# Patient Record
Sex: MALE | Race: ASIAN | NOT HISPANIC OR LATINO | URBAN - METROPOLITAN AREA
[De-identification: names, ages, dates, MRNs, and addresses within clinical notes are randomized per-mention and may not be internally consistent; named-entity substitution may affect disease eponyms.]

---
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
## 2019-04-05 ENCOUNTER — EMERGENCY (EMERGENCY)
Facility: HOSPITAL | Age: 19
LOS: 1 days | Discharge: HOME | End: 2019-04-05
Attending: EMERGENCY MEDICINE | Admitting: EMERGENCY MEDICINE
Payer: COMMERCIAL

## 2019-04-05 VITALS
RESPIRATION RATE: 18 BRPM | OXYGEN SATURATION: 100 % | TEMPERATURE: 98 F | SYSTOLIC BLOOD PRESSURE: 154 MMHG | DIASTOLIC BLOOD PRESSURE: 95 MMHG | HEART RATE: 100 BPM

## 2019-04-05 VITALS
DIASTOLIC BLOOD PRESSURE: 70 MMHG | RESPIRATION RATE: 17 BRPM | SYSTOLIC BLOOD PRESSURE: 147 MMHG | OXYGEN SATURATION: 100 % | HEART RATE: 80 BPM

## 2019-04-05 LAB
ALBUMIN SERPL ELPH-MCNC: 5.2 G/DL — HIGH (ref 3.3–5)
ALP SERPL-CCNC: 81 U/L — SIGNIFICANT CHANGE UP (ref 60–270)
ALT FLD-CCNC: 38 U/L — SIGNIFICANT CHANGE UP (ref 4–41)
ANION GAP SERPL CALC-SCNC: 13 MMO/L — SIGNIFICANT CHANGE UP (ref 7–14)
AST SERPL-CCNC: 22 U/L — SIGNIFICANT CHANGE UP (ref 4–40)
BASOPHILS # BLD AUTO: 0.05 K/UL — SIGNIFICANT CHANGE UP (ref 0–0.2)
BASOPHILS NFR BLD AUTO: 0.5 % — SIGNIFICANT CHANGE UP (ref 0–2)
BILIRUB SERPL-MCNC: 0.5 MG/DL — SIGNIFICANT CHANGE UP (ref 0.2–1.2)
BUN SERPL-MCNC: 12 MG/DL — SIGNIFICANT CHANGE UP (ref 7–23)
CALCIUM SERPL-MCNC: 10.1 MG/DL — SIGNIFICANT CHANGE UP (ref 8.4–10.5)
CHLORIDE SERPL-SCNC: 98 MMOL/L — SIGNIFICANT CHANGE UP (ref 98–107)
CO2 SERPL-SCNC: 28 MMOL/L — SIGNIFICANT CHANGE UP (ref 22–31)
CREAT SERPL-MCNC: 0.96 MG/DL — SIGNIFICANT CHANGE UP (ref 0.5–1.3)
EOSINOPHIL # BLD AUTO: 0.15 K/UL — SIGNIFICANT CHANGE UP (ref 0–0.5)
EOSINOPHIL NFR BLD AUTO: 1.4 % — SIGNIFICANT CHANGE UP (ref 0–6)
GLUCOSE SERPL-MCNC: 106 MG/DL — HIGH (ref 70–99)
HCT VFR BLD CALC: 48.9 % — SIGNIFICANT CHANGE UP (ref 39–50)
HGB BLD-MCNC: 16.1 G/DL — SIGNIFICANT CHANGE UP (ref 13–17)
IMM GRANULOCYTES NFR BLD AUTO: 0.4 % — SIGNIFICANT CHANGE UP (ref 0–1.5)
LYMPHOCYTES # BLD AUTO: 3.54 K/UL — HIGH (ref 1–3.3)
LYMPHOCYTES # BLD AUTO: 33.3 % — SIGNIFICANT CHANGE UP (ref 13–44)
MCHC RBC-ENTMCNC: 26 PG — LOW (ref 27–34)
MCHC RBC-ENTMCNC: 32.9 % — SIGNIFICANT CHANGE UP (ref 32–36)
MCV RBC AUTO: 79 FL — LOW (ref 80–100)
MONOCYTES # BLD AUTO: 0.78 K/UL — SIGNIFICANT CHANGE UP (ref 0–0.9)
MONOCYTES NFR BLD AUTO: 7.3 % — SIGNIFICANT CHANGE UP (ref 2–14)
NEUTROPHILS # BLD AUTO: 6.07 K/UL — SIGNIFICANT CHANGE UP (ref 1.8–7.4)
NEUTROPHILS NFR BLD AUTO: 57.1 % — SIGNIFICANT CHANGE UP (ref 43–77)
NRBC # FLD: 0 K/UL — SIGNIFICANT CHANGE UP (ref 0–0)
PLATELET # BLD AUTO: 284 K/UL — SIGNIFICANT CHANGE UP (ref 150–400)
PMV BLD: 10.1 FL — SIGNIFICANT CHANGE UP (ref 7–13)
POTASSIUM SERPL-MCNC: 4.4 MMOL/L — SIGNIFICANT CHANGE UP (ref 3.5–5.3)
POTASSIUM SERPL-SCNC: 4.4 MMOL/L — SIGNIFICANT CHANGE UP (ref 3.5–5.3)
PROT SERPL-MCNC: 7.9 G/DL — SIGNIFICANT CHANGE UP (ref 6–8.3)
RBC # BLD: 6.19 M/UL — HIGH (ref 4.2–5.8)
RBC # FLD: 13.3 % — SIGNIFICANT CHANGE UP (ref 10.3–14.5)
SODIUM SERPL-SCNC: 139 MMOL/L — SIGNIFICANT CHANGE UP (ref 135–145)
TROPONIN T, HIGH SENSITIVITY: < 6 NG/L — SIGNIFICANT CHANGE UP (ref ?–14)
TSH SERPL-MCNC: 4.3 UIU/ML — SIGNIFICANT CHANGE UP (ref 0.5–4.3)
WBC # BLD: 10.63 K/UL — HIGH (ref 3.8–10.5)
WBC # FLD AUTO: 10.63 K/UL — HIGH (ref 3.8–10.5)

## 2019-04-05 PROCEDURE — 71046 X-RAY EXAM CHEST 2 VIEWS: CPT | Mod: 26

## 2019-04-05 PROCEDURE — 99283 EMERGENCY DEPT VISIT LOW MDM: CPT | Mod: 25

## 2019-04-05 NOTE — ED PROVIDER NOTE - CLINICAL SUMMARY MEDICAL DECISION MAKING FREE TEXT BOX
- consider paroxysmal arrhythmia, r/o thyroid abnormality and anemia  - cbc, cmp, trop, tsh, ekg, cxr

## 2019-04-05 NOTE — ED PROVIDER NOTE - NSFOLLOWUPINSTRUCTIONS_ED_ALL_ED_FT
Recommend follow up with a primary care physician for check up.    You may benefit from a Holter monitor, follow up with cardiology.     Return to the emergency department for any worsening symptoms.

## 2019-04-05 NOTE — ED ADULT NURSE NOTE - OBJECTIVE STATEMENT
pt received to room 7 a/o x 3 c/o intermittent left sided non radiating chest pressure x 2 days, Dizziness, SOB lightheadedness at 1230am today. Pt states he still feels a little lightheaded but the SOB and dizziness had resolved. Respirations even and unlabored. Lung sounds clear with equal chest rise bilaterally. ABD is soft, non tender, non distended with normal active bowel sounds. No complaints of  headache, nausea, dizziness, vomiting  SOB, fever, chills verbalized. Pt placed on cardiac monitor in NSR.

## 2019-04-05 NOTE — ED ADULT TRIAGE NOTE - CHIEF COMPLAINT QUOTE
Pt states "my heart feels like someone is grabbing it on the left side" rad to left arm with numbness for 2 days. Also c/o lightheadedness, dizziness, SOB, palpitations. Appears anxious, states "I'm so scared right now." Denies pmhx. EKG to be obtained.

## 2019-04-05 NOTE — ED ADULT NURSE NOTE - NSIMPLEMENTINTERV_GEN_ALL_ED
Implemented All Universal Safety Interventions:  [place] to call system. Call bell, personal items and telephone within reach. Instruct patient to call for assistance. Room bathroom lighting operational. Non-slip footwear when patient is off stretcher. Physically safe environment: no spills, clutter or unnecessary equipment. Stretcher in lowest position, wheels locked, appropriate side rails in place.

## 2019-04-05 NOTE — ED PROVIDER NOTE - NSFOLLOWUPCLINICS_GEN_ALL_ED_FT
[facility]  Cardiology  [address] [place] [id number]  Phone: [phone]  Fax:   Follow Up Time:     [facility]  General Internal Medicine  [address] [place] [id number]  Phone: [phone]  Fax:   Follow Up Time:

## 2019-04-05 NOTE — ED PROVIDER NOTE - OBJECTIVE STATEMENT
18M denies pmh presents after episode of chest squeezing sensation with palpitations and SOB.  +Lightheadedness.  Paresthesia to LUE.  Denies fever/chills, n/v/d, recent travel, family h/o early cardiac disease.

## 2019-04-07 NOTE — ED ADULT TRIAGE NOTE - PRO INTERPRETER NEED 2
Gen: Alert, Well appearing. NAD    Head: NC, AT, PERRL, EOMI, normal lids/conjunctiva   Pulm: Bilateral clear BS, normal resp effort, no wheeze/stridor/retractions  CV: RRR, no M/R/G, +dist pulses   Abd: soft, ++ abd distension, ND, +BS, no guarding/rebound tenderness  Mskel: no edema/erythema/cyanosis   Skin: no rash   Neuro: AAOx3, no sensory/motor deficit
English

## 2019-09-12 NOTE — ED PROVIDER NOTE - NS_ATTENDINGSCRIBE_ED_ALL_ED
Addended by: TROY GUERRERO on: 9/12/2019 11:10 AM     Modules accepted: Orders    
I personally performed the service described in the documentation recorded by the scribe in my presence, and it accurately and completely records my words and actions.

## 2021-06-18 ENCOUNTER — EMERGENCY (EMERGENCY)
Facility: HOSPITAL | Age: 21
LOS: 1 days | Discharge: HOME | End: 2021-06-18
Admitting: EMERGENCY MEDICINE
Payer: COMMERCIAL

## 2021-06-18 VITALS
HEART RATE: 89 BPM | RESPIRATION RATE: 15 BRPM | DIASTOLIC BLOOD PRESSURE: 93 MMHG | TEMPERATURE: 99 F | OXYGEN SATURATION: 99 % | SYSTOLIC BLOOD PRESSURE: 146 MMHG

## 2021-06-18 PROCEDURE — 73610 X-RAY EXAM OF ANKLE: CPT | Mod: 26,RT

## 2021-06-18 PROCEDURE — 99283 EMERGENCY DEPT VISIT LOW MDM: CPT

## 2021-06-18 RX ORDER — IBUPROFEN 200 MG
400 TABLET ORAL ONCE
Refills: 0 | Status: COMPLETED | OUTPATIENT
Start: 2021-06-18 | End: 2021-06-18

## 2021-06-18 RX ADMIN — Medication 400 MILLIGRAM(S): at 23:27

## 2021-06-18 NOTE — ED PROVIDER NOTE - PATIENT PORTAL LINK FT
You can access the FollowMyHealth Patient Portal offered by [facility] by registering at the following website: [email or web address]. By joining [employer]’s FollowMyHealth portal, you will also be able to view your health information using other applications (apps) compatible with our system.

## 2021-06-18 NOTE — ED PROVIDER NOTE - CLINICAL SUMMARY MEDICAL DECISION MAKING FREE TEXT BOX
21 y/o M w/ no significant PMH presents to the ER c/o R ankle pain s/p twisting injury 2 days ago. Pt is well appearing and in NAD. Will obtain x-rays, give pain control, RICE and follow up ortho. 21 y/o Male with no significant PMHx presents to the ER c/o R ankle pain s/p twisting injury 2 days ago.  Pt is well appearing and in NAD. Will obtain x-rays, pain control, RICE and follow up ortho.

## 2021-06-18 NOTE — ED PROVIDER NOTE - PHYSICAL EXAMINATION
RLE: NV intact, +TTP along medial malleolus, mild swelling. Moving all toes. Foot non tender. Knee nontender w/ FROM.

## 2021-06-18 NOTE — ED PROVIDER NOTE - NSFOLLOWUPINSTRUCTIONS_ED_ALL_ED_FT
Follow up with orthopedics in 1-2 days.  (See list attached)    Rest, Ice 3-4 x a day x 48hours, elevate ankle, ace/aircast.    Use crutches to ambulate.    Take Ibuprofen 600mg orally every 8 hours as needed for pain take with food.    Return to the ER for any persistent/worsening or new symptoms weakness, numbness or any concerning symptoms.

## 2021-06-18 NOTE — ED ADULT TRIAGE NOTE - CHIEF COMPLAINT QUOTE
alert no distress c/o  left ankle pain twisted it playing basketball 2 days ago   pain is getting worse

## 2021-06-18 NOTE — ED PROVIDER NOTE - OBJECTIVE STATEMENT
19 y/o M w/ no significant PMH presents to the ER c/o R ankle pain s/p twisting injury 2 days ago. Pt states he was playing basketball when he twisted his ankle, causing injury. Pt denies any head trauma, LOC, weakness, numbness or tingling. 21 y/o Male with no significant PMHx presents to the ER c/o R ankle pain s/p twisting injury 2 days ago. Pt states he was playing basketball when he twisted his ankle, causing injury. Pt denies head trauma, LOC, weakness, numbness, tingling.

## 2022-01-07 NOTE — ED ADULT TRIAGE NOTE - RESPIRATORY RATE (BREATHS/MIN)
[Care Plan reviewed and provided to patient/caregiver] : Care plan reviewed and provided to patient/caregiver [Understands and communicates without difficulty] : Patient/Caregiver understands and communicates without difficulty 15

## 2022-11-20 ENCOUNTER — EMERGENCY (EMERGENCY)
Facility: HOSPITAL | Age: 22
LOS: 1 days | Discharge: HOME | End: 2022-11-20
Attending: EMERGENCY MEDICINE | Admitting: EMERGENCY MEDICINE

## 2022-11-20 VITALS
RESPIRATION RATE: 15 BRPM | TEMPERATURE: 98 F | SYSTOLIC BLOOD PRESSURE: 127 MMHG | DIASTOLIC BLOOD PRESSURE: 77 MMHG | OXYGEN SATURATION: 99 % | HEART RATE: 92 BPM

## 2022-11-20 PROCEDURE — 99283 EMERGENCY DEPT VISIT LOW MDM: CPT

## 2022-11-20 PROCEDURE — 73630 X-RAY EXAM OF FOOT: CPT | Mod: 26,RT

## 2022-11-20 RX ORDER — IBUPROFEN 200 MG
600 TABLET ORAL ONCE
Refills: 0 | Status: COMPLETED | OUTPATIENT
Start: 2022-11-20 | End: 2022-11-20

## 2022-11-20 RX ADMIN — Medication 600 MILLIGRAM(S): at 03:00

## 2022-11-20 NOTE — ED ADULT TRIAGE NOTE - CHIEF COMPLAINT QUOTE
Pt c/o R big toe pain, injured while playing basketball. Denies any other complaints. Denies any PMHx.

## 2022-11-20 NOTE — ED PROVIDER NOTE - CLINICAL SUMMARY MEDICAL DECISION MAKING FREE TEXT BOX
Talita Ruff DO PGY-2  22 year old male with no pmh presents with R toe pain for 1 day, sharp, worse with ambulation. R 1st toe and 1st MT tenderness to palpation. Will evaluate for fracture treat pain and likely dc.

## 2022-11-20 NOTE — ED PROVIDER NOTE - PATIENT PORTAL LINK FT
You can access the FollowMyHealth Patient Portal offered by [facility] by registering at the following website: [email or web address]. By joining [employer]’s FollowMyHealth portal, you will also be able to view your health information using other applications (apps) compatible with our system.

## 2022-11-20 NOTE — ED PROVIDER NOTE - OBJECTIVE STATEMENT
22 year old male with no pmh presents with R toe pain for 1 day, sharp, worse with ambulation. Patient was playing basketball yesterday when he was pushed into a wall and toe bent the wrong way. Since then has been having pain and swelling around the toe, ambulatory with limp. Denies other injuries. Denies numbness/tingling, weakness. Took tylenol yesterday for symptoms with minimal relief.

## 2022-11-20 NOTE — ED PROVIDER NOTE - IV ALTEPLASE EXCL ABS HIDDEN
See patient's note. I did make another encounter for Carlos and an order is pending for nutritional counseling.     show

## 2022-11-20 NOTE — ED PROVIDER NOTE - PHYSICAL EXAMINATION
PHYSICAL EXAM:  CONSTITUTIONAL: Well appearing, awake, alert, oriented to person, place, time/situation and in no apparent distress.  HEAD: Atraumatic  EYES: Clear bilaterally, pupils equal, round and reactive to light.  CARDIAC: Normal rate, regular rhythm. +S1/S2. No murmurs, rubs or gallops.  RESPIRATORY: Breathing unlabored. Breath sounds clear and equal bilaterally.  MSK: R 1st toe ecchymosis, tenderness along PIP and tenderness along 1st metatarsal. No lacerations/lesions. Sensation intact. <2 second capillary refill. ROM intact.   SKIN: Skin warm and dry. No evidence of rashes or lesions.

## 2022-11-20 NOTE — ED PROVIDER NOTE - PROGRESS NOTE DETAILS
All results d/w patient and copies given with instructions to bring with them to their follow up appointment.  The patient was given verbal and written discharge instructions Specifically, instructions when to return to the ED and to seek follow-up from their doctor within few days. The patient understands that should their symptoms worsen or any new symptoms arise, they should return to the ED immediately for further evaluation.  Vss, NAD, tolerating po and ambulating steadily at discharge.

## 2022-11-20 NOTE — ED PROVIDER NOTE - NSFOLLOWUPINSTRUCTIONS_ED_ALL_ED_FT
You were seen in the ER for your toe pain. You had x-rays done which thankfully did not show any fracture.    Please wear the hard soled shoe for comfort.     Take ibuprofen 600 mg every 6-8 hours for your pain.  Additionally, you can take 1000 mg acetaminophen every 8 hours for your pain. Do not exceed 3000 mg of acetaminophen in one 24-hour period.     Follow up with your primary doctor within 3 days.  You were given copies of the labs and imaging results, please take them to your follow up appointments.    Return to the ER for any worsening symptoms or concerns.

## 2022-11-20 NOTE — ED PROVIDER NOTE - ATTENDING CONTRIBUTION TO CARE
Well appearing  ao3  unlabored breathing  all ext with from  Left first toe with ecchymosis and ttp to region of first mtp, still with from, SILT, cap refill <2s, no deformity.   Patient with left first toe pain after was pushed and foot kicked a wall while playing sports yesterday. Has pain to left first toe. Patient with left first toe pain after was pushed and foot kicked a wall while playing sports yesterday. Has pain to left first toe. Noted bruising to it, no deformity, no numbness, no tingling, no difficulty moving the toes, no other injuries or trauma.  Well appearing  ao3  unlabored breathing  neck supple  all ext with from  Left first toe with ecchymosis and ttp to region of first mtp, still with from, SILT, cap refill <2s, no deformity. Suspect contusion-will xr to eval for fx, pain control.

## 2023-02-08 NOTE — ED PROVIDER NOTE - IV ALTEPLASE DOOR HIDDEN
Verbally reviewed discharge instructions for care and follow up. Previous print out of these instructions were given with prior treatment. Patient verbalized understanding of these instructions. show

## 2023-02-16 PROCEDURE — 99203 OFFICE O/P NEW LOW 30 MIN: CPT

## 2023-04-03 PROBLEM — Z00.00 ENCOUNTER FOR PREVENTIVE HEALTH EXAMINATION: Status: ACTIVE | Noted: 2023-04-03
